# Patient Record
Sex: FEMALE | Race: WHITE | NOT HISPANIC OR LATINO
[De-identification: names, ages, dates, MRNs, and addresses within clinical notes are randomized per-mention and may not be internally consistent; named-entity substitution may affect disease eponyms.]

---

## 2017-01-14 ENCOUNTER — TRANSCRIPTION ENCOUNTER (OUTPATIENT)
Age: 52
End: 2017-01-14

## 2018-07-26 ENCOUNTER — EMERGENCY (EMERGENCY)
Facility: HOSPITAL | Age: 53
LOS: 0 days | Discharge: HOME | End: 2018-07-26
Attending: EMERGENCY MEDICINE
Payer: COMMERCIAL

## 2018-07-26 VITALS
RESPIRATION RATE: 17 BRPM | DIASTOLIC BLOOD PRESSURE: 82 MMHG | OXYGEN SATURATION: 98 % | HEART RATE: 92 BPM | TEMPERATURE: 98 F | SYSTOLIC BLOOD PRESSURE: 124 MMHG

## 2018-07-26 VITALS
TEMPERATURE: 98 F | DIASTOLIC BLOOD PRESSURE: 75 MMHG | HEIGHT: 66 IN | RESPIRATION RATE: 16 BRPM | OXYGEN SATURATION: 100 % | WEIGHT: 125 LBS | HEART RATE: 80 BPM | SYSTOLIC BLOOD PRESSURE: 110 MMHG

## 2018-07-26 DIAGNOSIS — R59.9 ENLARGED LYMPH NODES, UNSPECIFIED: ICD-10-CM

## 2018-07-26 DIAGNOSIS — Z79.1 LONG TERM (CURRENT) USE OF NON-STEROIDAL ANTI-INFLAMMATORIES (NSAID): ICD-10-CM

## 2018-07-26 DIAGNOSIS — B02.9 ZOSTER WITHOUT COMPLICATIONS: ICD-10-CM

## 2018-07-26 DIAGNOSIS — F17.210 NICOTINE DEPENDENCE, CIGARETTES, UNCOMPLICATED: ICD-10-CM

## 2018-07-26 DIAGNOSIS — R07.9 CHEST PAIN, UNSPECIFIED: ICD-10-CM

## 2018-07-26 LAB
ALBUMIN SERPL ELPH-MCNC: 3.5 G/DL — SIGNIFICANT CHANGE UP (ref 3.3–5)
ALP SERPL-CCNC: 75 U/L — SIGNIFICANT CHANGE UP (ref 40–120)
ALT FLD-CCNC: 22 U/L — SIGNIFICANT CHANGE UP (ref 12–78)
ANION GAP SERPL CALC-SCNC: 5 MMOL/L — SIGNIFICANT CHANGE UP (ref 5–17)
APTT BLD: 26.8 SEC — LOW (ref 27.5–37.4)
AST SERPL-CCNC: 8 U/L — LOW (ref 15–37)
BASOPHILS # BLD AUTO: 0.04 K/UL — SIGNIFICANT CHANGE UP (ref 0–0.2)
BASOPHILS NFR BLD AUTO: 0.6 % — SIGNIFICANT CHANGE UP (ref 0–2)
BILIRUB SERPL-MCNC: 0.2 MG/DL — SIGNIFICANT CHANGE UP (ref 0.2–1.2)
BUN SERPL-MCNC: 17 MG/DL — SIGNIFICANT CHANGE UP (ref 7–23)
CALCIUM SERPL-MCNC: 8.7 MG/DL — SIGNIFICANT CHANGE UP (ref 8.5–10.1)
CHLORIDE SERPL-SCNC: 108 MMOL/L — SIGNIFICANT CHANGE UP (ref 96–108)
CK MB BLD-MCNC: <1.4 % — SIGNIFICANT CHANGE UP (ref 0–3.5)
CK MB CFR SERPL CALC: <1 NG/ML — SIGNIFICANT CHANGE UP (ref 0.5–3.6)
CK SERPL-CCNC: 70 U/L — SIGNIFICANT CHANGE UP (ref 26–192)
CO2 SERPL-SCNC: 29 MMOL/L — SIGNIFICANT CHANGE UP (ref 22–31)
CREAT SERPL-MCNC: 0.73 MG/DL — SIGNIFICANT CHANGE UP (ref 0.5–1.3)
EOSINOPHIL # BLD AUTO: 0.12 K/UL — SIGNIFICANT CHANGE UP (ref 0–0.5)
EOSINOPHIL NFR BLD AUTO: 1.9 % — SIGNIFICANT CHANGE UP (ref 0–6)
GLUCOSE SERPL-MCNC: 110 MG/DL — HIGH (ref 70–99)
HCT VFR BLD CALC: 36.9 % — SIGNIFICANT CHANGE UP (ref 34.5–45)
HGB BLD-MCNC: 12.6 G/DL — SIGNIFICANT CHANGE UP (ref 11.5–15.5)
IMM GRANULOCYTES NFR BLD AUTO: 0.3 % — SIGNIFICANT CHANGE UP (ref 0–1.5)
INR BLD: 1.03 RATIO — SIGNIFICANT CHANGE UP (ref 0.88–1.16)
LYMPHOCYTES # BLD AUTO: 2.1 K/UL — SIGNIFICANT CHANGE UP (ref 1–3.3)
LYMPHOCYTES # BLD AUTO: 33.5 % — SIGNIFICANT CHANGE UP (ref 13–44)
MCHC RBC-ENTMCNC: 31.2 PG — SIGNIFICANT CHANGE UP (ref 27–34)
MCHC RBC-ENTMCNC: 34.1 GM/DL — SIGNIFICANT CHANGE UP (ref 32–36)
MCV RBC AUTO: 91.3 FL — SIGNIFICANT CHANGE UP (ref 80–100)
MONOCYTES # BLD AUTO: 0.56 K/UL — SIGNIFICANT CHANGE UP (ref 0–0.9)
MONOCYTES NFR BLD AUTO: 8.9 % — SIGNIFICANT CHANGE UP (ref 2–14)
NEUTROPHILS # BLD AUTO: 3.42 K/UL — SIGNIFICANT CHANGE UP (ref 1.8–7.4)
NEUTROPHILS NFR BLD AUTO: 54.8 % — SIGNIFICANT CHANGE UP (ref 43–77)
NRBC # BLD: 0 /100 WBCS — SIGNIFICANT CHANGE UP (ref 0–0)
PLATELET # BLD AUTO: 186 K/UL — SIGNIFICANT CHANGE UP (ref 150–400)
POTASSIUM SERPL-MCNC: 4.4 MMOL/L — SIGNIFICANT CHANGE UP (ref 3.5–5.3)
POTASSIUM SERPL-SCNC: 4.4 MMOL/L — SIGNIFICANT CHANGE UP (ref 3.5–5.3)
PROT SERPL-MCNC: 6.7 GM/DL — SIGNIFICANT CHANGE UP (ref 6–8.3)
PROTHROM AB SERPL-ACNC: 11.2 SEC — SIGNIFICANT CHANGE UP (ref 9.8–12.7)
RBC # BLD: 4.04 M/UL — SIGNIFICANT CHANGE UP (ref 3.8–5.2)
RBC # FLD: 12.1 % — SIGNIFICANT CHANGE UP (ref 10.3–14.5)
SODIUM SERPL-SCNC: 142 MMOL/L — SIGNIFICANT CHANGE UP (ref 135–145)
TROPONIN I SERPL-MCNC: <.015 NG/ML — SIGNIFICANT CHANGE UP (ref 0.01–0.04)
WBC # BLD: 6.26 K/UL — SIGNIFICANT CHANGE UP (ref 3.8–10.5)
WBC # FLD AUTO: 6.26 K/UL — SIGNIFICANT CHANGE UP (ref 3.8–10.5)

## 2018-07-26 PROCEDURE — 99285 EMERGENCY DEPT VISIT HI MDM: CPT

## 2018-07-26 PROCEDURE — 71045 X-RAY EXAM CHEST 1 VIEW: CPT | Mod: 26

## 2018-07-26 RX ORDER — HYDROCORTISONE 1 %
1 OINTMENT (GRAM) TOPICAL
Qty: 1 | Refills: 0 | OUTPATIENT
Start: 2018-07-26 | End: 2018-08-01

## 2018-07-26 RX ORDER — KETOROLAC TROMETHAMINE 30 MG/ML
30 SYRINGE (ML) INJECTION ONCE
Qty: 0 | Refills: 0 | Status: DISCONTINUED | OUTPATIENT
Start: 2018-07-26 | End: 2018-07-26

## 2018-07-26 RX ORDER — HYDROCORTISONE 1 %
1 OINTMENT (GRAM) TOPICAL ONCE
Qty: 0 | Refills: 0 | Status: COMPLETED | OUTPATIENT
Start: 2018-07-26 | End: 2018-07-26

## 2018-07-26 RX ORDER — SODIUM CHLORIDE 9 MG/ML
3 INJECTION INTRAMUSCULAR; INTRAVENOUS; SUBCUTANEOUS ONCE
Qty: 0 | Refills: 0 | Status: COMPLETED | OUTPATIENT
Start: 2018-07-26 | End: 2018-07-26

## 2018-07-26 RX ORDER — VALACYCLOVIR 500 MG/1
1000 TABLET, FILM COATED ORAL ONCE
Qty: 0 | Refills: 0 | Status: COMPLETED | OUTPATIENT
Start: 2018-07-26 | End: 2018-07-26

## 2018-07-26 RX ORDER — VALACYCLOVIR 500 MG/1
1 TABLET, FILM COATED ORAL
Qty: 14 | Refills: 0 | OUTPATIENT
Start: 2018-07-26 | End: 2018-08-01

## 2018-07-26 RX ORDER — SODIUM CHLORIDE 9 MG/ML
1000 INJECTION INTRAMUSCULAR; INTRAVENOUS; SUBCUTANEOUS ONCE
Qty: 0 | Refills: 0 | Status: COMPLETED | OUTPATIENT
Start: 2018-07-26 | End: 2018-07-26

## 2018-07-26 RX ORDER — IBUPROFEN 200 MG
1 TABLET ORAL
Qty: 30 | Refills: 0 | OUTPATIENT
Start: 2018-07-26 | End: 2018-08-24

## 2018-07-26 RX ORDER — OXYCODONE HYDROCHLORIDE 5 MG/1
1 TABLET ORAL
Qty: 20 | Refills: 0 | OUTPATIENT
Start: 2018-07-26 | End: 2018-07-27

## 2018-07-26 RX ADMIN — SODIUM CHLORIDE 2000 MILLILITER(S): 9 INJECTION INTRAMUSCULAR; INTRAVENOUS; SUBCUTANEOUS at 20:13

## 2018-07-26 RX ADMIN — SODIUM CHLORIDE 1000 MILLILITER(S): 9 INJECTION INTRAMUSCULAR; INTRAVENOUS; SUBCUTANEOUS at 21:20

## 2018-07-26 RX ADMIN — Medication 30 MILLIGRAM(S): at 20:13

## 2018-07-26 RX ADMIN — SODIUM CHLORIDE 3 MILLILITER(S): 9 INJECTION INTRAMUSCULAR; INTRAVENOUS; SUBCUTANEOUS at 18:45

## 2018-07-26 RX ADMIN — Medication 50 MILLIGRAM(S): at 18:36

## 2018-07-26 RX ADMIN — VALACYCLOVIR 1000 MILLIGRAM(S): 500 TABLET, FILM COATED ORAL at 18:37

## 2018-07-26 RX ADMIN — Medication 30 MILLIGRAM(S): at 21:28

## 2018-07-26 RX ADMIN — Medication 1 APPLICATION(S): at 18:36

## 2018-07-26 NOTE — ED PROVIDER NOTE - NS ED ROS FT
Constitutional: (-) fever  (-)chills  (-)sweats  Eyes/ENT: (-) blurry vision, (-) epistaxis  (-)rhinorrhea   (-) sore throat    Cardiovascular: (+) chest pain, (-) palpitations (-) edema   Respiratory: (-) cough, (-) shortness of breath   Gastrointestinal: (-)nausea  (-)vomiting, (-) diarrhea  (-) abdominal pain   :  (-)dysuria, (-)frequency, (-)urgency, (-)hematuria  Musculoskeletal: (-) neck pain, (-) back pain, (-) joint pain  Integumentary: (+) rash, (-) edema  Neurological: (-) headache, (-) altered mental status  (-)LOC

## 2018-07-26 NOTE — ED PROVIDER NOTE - PHYSICAL EXAMINATION
General:     NAD, well-nourished, well-appearing  Head:     NC/AT, EOMI, oral mucosa moist  Neck:     trachea midline  Lungs:     CTA b/l, no w/r/r  CVS:     S1S2, RRR, no m/g/r  CHEST WALL: vesicular lesions in dermatomal distribution @T5  Abd:     +BS, s/nt/nd, no organomegaly  Ext:    2+ radial and pedal pulses, no c/c/e  Neuro: AAOx3, no sensory/motor deficits

## 2018-07-26 NOTE — ED ADULT NURSE REASSESSMENT NOTE - NS ED NURSE REASSESS COMMENT FT1
Pt able to ambulate safely and steadily w/out assistance, denies dizziness/weakness upon standing, IV removed, pt d/c home w/ family. Education deemed successful at time of discharge when patient provided a successful teach back demonstration. Pt educated extensively about follow up care, medication reviewed, and patient provided extensive education about diagnostic testing and results that were educated by the MD and reinforced by the RN.

## 2018-07-26 NOTE — ED PROVIDER NOTE - OBJECTIVE STATEMENT
53yoF; with no signif pmh; now p/w chest pain--began under scapula, radiating to under left breast, burning sensation, constant, pressure/burning. denies sob/palp. denies lightheadedness. denies f/c/s. also c/o left axillary lymph node enlargement and tenderness. denies sick contacts. denies travel. denies trauma.  smokes 1 pack per week.  PMH: denies  SOCIAL: +smoking, social etoh

## 2018-07-26 NOTE — ED PROVIDER NOTE - MEDICAL DECISION MAKING DETAILS
Patient with shingles rash, feels better s/p medication, educated on need to follow up with lymphadenopathy with surgeon and given referral.  cardiac enzymes negative, normal EKG.

## 2019-05-22 PROBLEM — Z00.00 ENCOUNTER FOR PREVENTIVE HEALTH EXAMINATION: Status: ACTIVE | Noted: 2019-05-22

## 2019-06-03 ENCOUNTER — APPOINTMENT (OUTPATIENT)
Dept: ORTHOPEDIC SURGERY | Facility: CLINIC | Age: 54
End: 2019-06-03

## 2019-09-27 ENCOUNTER — APPOINTMENT (OUTPATIENT)
Dept: ORTHOPEDIC SURGERY | Facility: CLINIC | Age: 54
End: 2019-09-27
Payer: COMMERCIAL

## 2019-09-27 PROCEDURE — 73502 X-RAY EXAM HIP UNI 2-3 VIEWS: CPT | Mod: RT

## 2019-09-27 PROCEDURE — 99203 OFFICE O/P NEW LOW 30 MIN: CPT

## 2019-09-27 NOTE — PHYSICAL EXAM
[de-identified] : General Exam\par \par Well developed, well nourished\par No apparent distress\par Oriented to person, place, and time\par Mood: Normal\par Affect: Normal\par Balance and coordination: Normal\par Gait: Normal \par \par Right hip exam\par \par Skin: Clean/dry and intact\par Inspection: No obvious deformity, no swelling, no ecchymosis.\par Tenderness:  no tenderness over greater trochanter/glut medius insertion. No tenderness pubic symphysis, pubic tubercle, hip flexors. No ttp ischial tuberosity or buttock. No ttp over the ASIS/Illiac crest.\par ROM: 0-120°. Internal rotation 30 external rotation 70\par Painful ROM: None\par Additional tests: No pain with circumduction pos impingement test at 90° mildly positive impingement test at 60° negative Christ [facility]\par Strength: 5/5 hip flexion/ADD/ABD/Q/H/TA/GS/EHL\par Neuro: Sensation in tact to light touch throughout in dp/sp/tib/max/saph distributions\par Pulses: 2+ DP/PT pulses  [de-identified] : \par The following radiographs were ordered and read by me during this patients visit. I reviewed each radiograph in detail with the patient and discussed the findings as highlighted below. \par \par AP pelvis AP lateral right hip were obtained today. There is hip dysplasia. There is arthritic changes with cystic changes in the femoral head and acetabulum\par

## 2019-09-27 NOTE — HISTORY OF PRESENT ILLNESS
[de-identified] : 54-year-old female chief complaint right hip pain for several months. Pain is located in her groin she denies a fall or trauma she denies a numbness or tingling she doesn't she lost mobility in her hip\par \par The patient's past medical history, past surgical history, medications, allergies, and social history were reviewed by me today with the patient and documented accordingly. In addition, the patient's family history, which is noncontributory to this visit, was also reviewed.

## 2019-10-18 ENCOUNTER — APPOINTMENT (OUTPATIENT)
Dept: ORTHOPEDIC SURGERY | Facility: CLINIC | Age: 54
End: 2019-10-18

## 2019-10-18 DIAGNOSIS — M25.561 PAIN IN RIGHT KNEE: ICD-10-CM

## 2019-10-25 ENCOUNTER — APPOINTMENT (OUTPATIENT)
Dept: ORTHOPEDIC SURGERY | Facility: CLINIC | Age: 54
End: 2019-10-25

## 2020-02-10 ENCOUNTER — APPOINTMENT (OUTPATIENT)
Dept: ORTHOPEDIC SURGERY | Facility: CLINIC | Age: 55
End: 2020-02-10

## 2020-02-12 ENCOUNTER — APPOINTMENT (OUTPATIENT)
Dept: ORTHOPEDIC SURGERY | Facility: CLINIC | Age: 55
End: 2020-02-12
Payer: COMMERCIAL

## 2020-02-12 DIAGNOSIS — Z87.828 PERSONAL HISTORY OF OTHER (HEALED) PHYSICAL INJURY AND TRAUMA: ICD-10-CM

## 2020-02-12 DIAGNOSIS — M17.11 UNILATERAL PRIMARY OSTEOARTHRITIS, RIGHT KNEE: ICD-10-CM

## 2020-02-12 PROCEDURE — 73560 X-RAY EXAM OF KNEE 1 OR 2: CPT | Mod: LT

## 2020-02-12 PROCEDURE — 73564 X-RAY EXAM KNEE 4 OR MORE: CPT | Mod: RT

## 2020-02-12 PROCEDURE — 99214 OFFICE O/P EST MOD 30 MIN: CPT

## 2020-04-15 ENCOUNTER — APPOINTMENT (OUTPATIENT)
Dept: ORTHOPEDIC SURGERY | Facility: CLINIC | Age: 55
End: 2020-04-15
Payer: COMMERCIAL

## 2020-04-15 PROCEDURE — 99441: CPT

## 2020-04-29 ENCOUNTER — TRANSCRIPTION ENCOUNTER (OUTPATIENT)
Age: 55
End: 2020-04-29

## 2020-04-30 ENCOUNTER — APPOINTMENT (OUTPATIENT)
Dept: ORTHOPEDIC SURGERY | Facility: CLINIC | Age: 55
End: 2020-04-30
Payer: COMMERCIAL

## 2020-04-30 PROCEDURE — 99442: CPT

## 2020-05-13 ENCOUNTER — OUTPATIENT (OUTPATIENT)
Dept: OUTPATIENT SERVICES | Facility: HOSPITAL | Age: 55
LOS: 1 days | End: 2020-05-13
Payer: COMMERCIAL

## 2020-05-13 ENCOUNTER — OUTPATIENT (OUTPATIENT)
Dept: OUTPATIENT SERVICES | Facility: HOSPITAL | Age: 55
LOS: 1 days | End: 2020-05-13

## 2020-05-13 ENCOUNTER — APPOINTMENT (OUTPATIENT)
Dept: RADIOLOGY | Facility: CLINIC | Age: 55
End: 2020-05-13
Payer: COMMERCIAL

## 2020-05-13 DIAGNOSIS — M25.551 PAIN IN RIGHT HIP: ICD-10-CM

## 2020-05-13 PROCEDURE — 27093 INJECTION FOR HIP X-RAY: CPT | Mod: RT

## 2020-05-13 PROCEDURE — 27093 INJECTION FOR HIP X-RAY: CPT

## 2020-05-13 PROCEDURE — 73525 CONTRAST X-RAY OF HIP: CPT | Mod: 26,RT

## 2020-05-13 PROCEDURE — 73525 CONTRAST X-RAY OF HIP: CPT

## 2020-06-08 ENCOUNTER — TRANSCRIPTION ENCOUNTER (OUTPATIENT)
Age: 55
End: 2020-06-08

## 2020-07-07 ENCOUNTER — APPOINTMENT (OUTPATIENT)
Dept: ORTHOPEDIC SURGERY | Facility: CLINIC | Age: 55
End: 2020-07-07
Payer: COMMERCIAL

## 2020-07-07 VITALS — TEMPERATURE: 98.7 F

## 2020-07-07 DIAGNOSIS — M47.812 SPONDYLOSIS W/OUT MYELOPATHY OR RADICULOPATHY, CERVICAL REGION: ICD-10-CM

## 2020-07-07 DIAGNOSIS — M54.12 RADICULOPATHY, CERVICAL REGION: ICD-10-CM

## 2020-07-07 PROCEDURE — 99215 OFFICE O/P EST HI 40 MIN: CPT

## 2020-07-07 PROCEDURE — 72040 X-RAY EXAM NECK SPINE 2-3 VW: CPT

## 2020-07-31 ENCOUNTER — APPOINTMENT (OUTPATIENT)
Dept: ORTHOPEDIC SURGERY | Facility: CLINIC | Age: 55
End: 2020-07-31
Payer: COMMERCIAL

## 2020-07-31 PROCEDURE — 99214 OFFICE O/P EST MOD 30 MIN: CPT | Mod: 95

## 2020-08-03 ENCOUNTER — APPOINTMENT (OUTPATIENT)
Dept: ORTHOPEDIC SURGERY | Facility: CLINIC | Age: 55
End: 2020-08-03
Payer: COMMERCIAL

## 2020-08-03 PROCEDURE — 99214 OFFICE O/P EST MOD 30 MIN: CPT

## 2020-08-21 ENCOUNTER — APPOINTMENT (OUTPATIENT)
Dept: ORTHOPEDIC SURGERY | Facility: CLINIC | Age: 55
End: 2020-08-21
Payer: COMMERCIAL

## 2020-08-21 DIAGNOSIS — M48.07 SPINAL STENOSIS, LUMBOSACRAL REGION: ICD-10-CM

## 2020-08-21 PROCEDURE — 99214 OFFICE O/P EST MOD 30 MIN: CPT | Mod: 95

## 2020-09-22 ENCOUNTER — TRANSCRIPTION ENCOUNTER (OUTPATIENT)
Age: 55
End: 2020-09-22

## 2020-09-22 RX ORDER — MELOXICAM 15 MG/1
15 TABLET ORAL
Qty: 60 | Refills: 1 | Status: ACTIVE | COMMUNITY
Start: 2019-09-27 | End: 1900-01-01

## 2020-10-20 ENCOUNTER — APPOINTMENT (OUTPATIENT)
Dept: ORTHOPEDIC SURGERY | Facility: CLINIC | Age: 55
End: 2020-10-20
Payer: COMMERCIAL

## 2020-10-20 PROCEDURE — 99442: CPT

## 2020-11-23 ENCOUNTER — APPOINTMENT (OUTPATIENT)
Dept: SPINE | Facility: CLINIC | Age: 55
End: 2020-11-23
Payer: COMMERCIAL

## 2020-11-23 VITALS
BODY MASS INDEX: 20.09 KG/M2 | HEIGHT: 66 IN | DIASTOLIC BLOOD PRESSURE: 67 MMHG | HEART RATE: 85 BPM | RESPIRATION RATE: 12 BRPM | TEMPERATURE: 98.7 F | SYSTOLIC BLOOD PRESSURE: 93 MMHG | WEIGHT: 125 LBS

## 2020-11-23 DIAGNOSIS — Z78.9 OTHER SPECIFIED HEALTH STATUS: ICD-10-CM

## 2020-11-23 PROCEDURE — 99203 OFFICE O/P NEW LOW 30 MIN: CPT

## 2020-11-23 NOTE — PHYSICAL EXAM
[Person] : oriented to person [Place] : oriented to place [Time] : oriented to time [Short Term Intact] : short term memory intact [Remote Intact] : remote memory intact [Span Intact] : the attention span was normal [Concentration Intact] : normal concentrating ability [Fluency] : fluency intact [Comprehension] : comprehension intact [Current Events] : adequate knowledge of current events [Past History] : adequate knowledge of personal past history [Vocabulary] : adequate range of vocabulary [Cranial Nerves Optic (II)] : visual acuity intact bilaterally,  pupils equal round and reactive to light [Cranial Nerves Oculomotor (III)] : extraocular motion intact [Cranial Nerves Trigeminal (V)] : facial sensation intact symmetrically [Cranial Nerves Facial (VII)] : face symmetrical [Cranial Nerves Vestibulocochlear (VIII)] : hearing was intact bilaterally [Cranial Nerves Glossopharyngeal (IX)] : tongue and palate midline [Cranial Nerves Accessory (XI - Cranial And Spinal)] : head turning and shoulder shrug symmetric [Cranial Nerves Hypoglossal (XII)] : there was no tongue deviation with protrusion [Motor Tone] : muscle tone was normal in all four extremities [Motor Strength] : muscle strength was normal in all four extremities [No Muscle Atrophy] : normal bulk in all four extremities [Sensation Tactile Decrease] : light touch was intact [Abnormal Walk] : normal gait [Balance] : balance was intact [Past-pointing] : there was no past-pointing [Tremor] : no tremor present [2+] : Ankle jerk left 2+ [Plantar Reflex Right Only] : normal on the right [Plantar Reflex Left Only] : normal on the left [Lo] : Lo's sign was not demonstrated [No Tenderness to Palpation] : no spine tenderness on palpation [Straight-Leg Raise Test - Left] : straight leg raise of the left leg was negative [Straight-Leg Raise Test - Right] : straight leg raise  of the right leg was negative [Able to toe walk] : the patient was able to toe walk [Able to heel walk] : the patient was able to heel walk

## 2020-11-23 NOTE — HISTORY OF PRESENT ILLNESS
[< 3 months] : less than 3 months [FreeTextEntry1] : Lower back pain  [de-identified] : This is a 55 year old female with twisting her back and sudden onset of lower back pain.  She has completed a course of PT and limited her activity.   She does feel that her pain has improved but she reports trouble sleeping.   She had trigger points over the summer in 2020 for the back pain and this was self limiting.  She is reporting muscle weakness and spasms throughout her lower extremities.  She is on NSAIDS that are helpful at times.  MRI of the lumbar spine  shows spondylolisthesis at L4 L5 with moderate stenosis.Noncompressive degenerative changes at other levels.

## 2020-11-23 NOTE — REASON FOR VISIT
[New Patient Visit] : a new patient visit [Other: _____] : [unfilled] [Referred By: _________] : Patient was referred by KATRINA [FreeTextEntry1] : Lower back pain

## 2020-11-23 NOTE — ASSESSMENT
[FreeTextEntry1] : 56 yo  female with lower back pain and  MRI shows spondylolisthesis of the L 4 L 5 level.   She was recommended to see a pain specialist for her back pain for epidurals.   Acupuncture and PT was also suggested.  Neurosurgery is not indicated at this time.  She has a follow up appointment with Dr Villarreal.

## 2020-12-15 ENCOUNTER — TRANSCRIPTION ENCOUNTER (OUTPATIENT)
Age: 55
End: 2020-12-15

## 2021-01-06 ENCOUNTER — TRANSCRIPTION ENCOUNTER (OUTPATIENT)
Age: 56
End: 2021-01-06

## 2021-01-27 ENCOUNTER — TRANSCRIPTION ENCOUNTER (OUTPATIENT)
Age: 56
End: 2021-01-27

## 2021-12-08 ENCOUNTER — NON-APPOINTMENT (OUTPATIENT)
Age: 56
End: 2021-12-08

## 2021-12-10 ENCOUNTER — APPOINTMENT (OUTPATIENT)
Dept: ORTHOPEDIC SURGERY | Facility: CLINIC | Age: 56
End: 2021-12-10
Payer: COMMERCIAL

## 2021-12-10 VITALS — WEIGHT: 125 LBS | HEIGHT: 66 IN | BODY MASS INDEX: 20.09 KG/M2

## 2021-12-10 VITALS — WEIGHT: 124 LBS | BODY MASS INDEX: 19.93 KG/M2 | HEIGHT: 66 IN

## 2021-12-10 DIAGNOSIS — M51.36 OTHER INTERVERTEBRAL DISC DEGENERATION, LUMBAR REGION: ICD-10-CM

## 2021-12-10 DIAGNOSIS — M43.16 SPONDYLOLISTHESIS, LUMBAR REGION: ICD-10-CM

## 2021-12-10 PROCEDURE — 72100 X-RAY EXAM L-S SPINE 2/3 VWS: CPT

## 2021-12-10 PROCEDURE — 99214 OFFICE O/P EST MOD 30 MIN: CPT

## 2021-12-22 ENCOUNTER — APPOINTMENT (OUTPATIENT)
Dept: ORTHOPEDIC SURGERY | Facility: CLINIC | Age: 56
End: 2021-12-22
Payer: COMMERCIAL

## 2021-12-22 VITALS — WEIGHT: 124 LBS | BODY MASS INDEX: 19.93 KG/M2 | HEIGHT: 66 IN

## 2021-12-22 DIAGNOSIS — M16.11 UNILATERAL PRIMARY OSTEOARTHRITIS, RIGHT HIP: ICD-10-CM

## 2021-12-22 PROCEDURE — 99213 OFFICE O/P EST LOW 20 MIN: CPT

## 2021-12-22 PROCEDURE — 73502 X-RAY EXAM HIP UNI 2-3 VIEWS: CPT | Mod: RT

## 2021-12-22 NOTE — HISTORY OF PRESENT ILLNESS
[de-identified] : 54-year-old female chief complaint right hip pain for several months. Pain is located in her groin she denies a fall or trauma she denies a numbness or tingling she doesn't she lost mobility in her hip

## 2021-12-22 NOTE — PHYSICAL EXAM
[de-identified] : Right hip exam\par \par Skin: Clean/dry and intact\par Inspection: No obvious deformity, no swelling, no ecchymosis.\par Tenderness: no tenderness over greater trochanter/glut medius insertion. No tenderness pubic symphysis, pubic tubercle, hip flexors. No ttp ischial tuberosity or buttock. No ttp over the ASIS/Illiac crest.\par ROM: 0-120°. Internal rotation 30 external rotation 70\par Painful ROM: None\par Additional tests: No pain with circumduction pos impingement test at 90° mildly positive impingement test at 60° negative Christ negative [facility]\par Strength: 5/5 hip flexion/ADD/ABD/Q/H/TA/GS/EHL\par Neuro: Sensation in tact to light touch throughout in dp/sp/tib/max/saph distributions\par Pulses: 2+ DP/PT pulses  [de-identified] : \par The following radiographs were ordered and read by me during this patients visit. I reviewed each radiograph in detail with the patient and discussed the findings as highlighted below. \par \par AP pelvis AP lateral right hip were obtained today.  There is severe osteoarthritis with joint space narrowing osteophytes and sclerosis

## 2021-12-22 NOTE — DISCUSSION/SUMMARY
[de-identified] : I discussed the treatment of degenerative arthritis with the patient at length today. I described the spectrum of treatment from nonoperative modalities to total joint arthroplasty. Noninvasive and nonoperative treatment modalities include weight reduction, activity modification with low impact exercise, PRN use of acetaminophen or anti-inflammatory medication if tolerated, glucosamine/chondroitin supplements, and physical therapy. Further treatments can include corticosteroid injection and the use of hyaluronic acid injections. Definitive treatment can certainly include total joint arthroplasty also. The risks and benefits of each treatment options was discussed and all questions were answered.  Refer for arthroplasty evaluation

## 2022-03-30 ENCOUNTER — APPOINTMENT (OUTPATIENT)
Dept: ORTHOPEDIC SURGERY | Facility: CLINIC | Age: 57
End: 2022-03-30
Payer: COMMERCIAL

## 2022-03-30 VITALS — BODY MASS INDEX: 20.89 KG/M2 | HEIGHT: 66 IN | WEIGHT: 130 LBS

## 2022-03-30 DIAGNOSIS — M17.0 BILATERAL PRIMARY OSTEOARTHRITIS OF KNEE: ICD-10-CM

## 2022-03-30 PROCEDURE — 73564 X-RAY EXAM KNEE 4 OR MORE: CPT | Mod: 50

## 2022-03-30 PROCEDURE — 99214 OFFICE O/P EST MOD 30 MIN: CPT

## 2022-03-30 RX ORDER — NAPROXEN 500 MG/1
500 TABLET ORAL
Qty: 60 | Refills: 0 | Status: ACTIVE | COMMUNITY
Start: 2022-03-30 | End: 1900-01-01

## 2022-03-30 NOTE — PHYSICAL EXAM
[de-identified] : General appearance: well nourished and hydrated, pleasant, alert and oriented x 3, cooperative.\par HEENT: Normocephalic, EOM intact, Nasal septum midline, Oral cavity clear, External auditory canal clear.\par Cardiovascular: no apparent abnormalities, no lower leg edema, no varicosities, pedal pulses are palpable.\par Lymphatics Lymph nodes: none palpated, Lymphedema: not present.\par Neurologic: sensation is normal, no muscle weakness in upper or lower extremities, patella tendon reflexes intact .\par Dermatologic no apparent skin lesions, moist, warm, no rash.\par Spine: cervical spine appears normal and moves freely, thoracic spine appears normal and moves freely, lumbosacral spine appears normal and moves freely.\par Gait: nonantalgic.\par \par Left knee\par Inspection: no effusion or erythema.\par Wounds: none.\par Alignment: normal.\par Palpation: no specific tenderness on palpation.\par ROM active (in degrees): 0-145\par Ligamentous laxity: all ligaments appear stable,, negative ant. drawer test, negative post. drawer test, stable to varus stress test, stable to valgus stress test. negative Lachman's test, negative pivot shift test\par Meniscal Test: negative McMurrays, negative Ela.\par Patellofemoral Alignment Test: Q angle-, normal.\par Muscle Test: good quad strength.\par Leg examination: calf is soft and non-tender.\par \par Right knee\par Inspection: no effusion or erythema.\par Wounds: healed midline incision, healed arthroscopic portals \par Alignment: normal.\par Palpation: no specific tenderness on palpation.\par ROM active (in degrees): 0-145\par Ligamentous laxity: all ligaments appear stable,, negative ant. drawer test, negative post. drawer test, stable to varus stress test, stable to valgus stress test. negative Lachman's test, negative pivot shift test\par Meniscal Test: negative McMurrays, negative Ela.\par Patellofemoral Alignment Test: Q angle-, normal.\par Muscle Test: good quad strength.\par Leg examination: calf is soft and non-tender.\par \par Left hip\par Inspection: No swelling or ecchymosis.\par Wounds: none.\par Palpation: non-tender.\par Stability: no instability.\par Strength: 5/5 all motor groups.\par ROM: no pain with FROM.\par Leg length: equal.\par \par Right hip\par Inspection: No swelling or ecchymosis.\par Wounds: none.\par Palpation: non-tender.\par Stability: no instability.\par Strength: 5/5 all motor groups.\par ROM: no pain with FROM palpable click especially with extremes of rotation\par Leg length: equal.\par \par Left ankle\par Inspection: no erythema noted, no swelling noted.\par Palpation: no pain on palpation .\par ROM: FROM without crepitus.\par Muscle strength: 5/5.\par Stability: no instability noted.\par \par Right ankle\par Inspection: no erythema noted, no swelling noted.\par ROM: FROM without crepitus.\par Palpation: no pain on palpation .\par Muscle strength: 5/5.\par Stability: no instability noted.\par \par Left foot\par Inspection: color, texture and turgor are normal.\par ROM: full range of motion of all joints without pain or crepitus.\par Palpation: no tenderness.\par Stability: no instability noted.\par \par Right foot\par Inspection: color, texture and turgor are normal.\par ROM: full range of motion of all joints without pain or crepitus.\par Palpation: no tenderness.\par Stability: no instability noted. [de-identified] : Left knee xray merchant view, taken at the office today demonstrates joint space narrowing and a well centered patella, patellofemoral arthritis.  \par \par Right knee xrays, standing AP/Lateral, Merchant films, and 45 degree PA standing view taken at the office today shows normal alignment, patella sits at an appropriate height in a central position, evidence of ACL reconstruction, patella screws noted, central position with slight joint space narrowing, Kellgren and Issac grade 1

## 2022-03-30 NOTE — DISCUSSION/SUMMARY
[de-identified] : Discussed at length the nature of the patients condition. Their right knee symptoms are minimal due to her degenerative arthritis. I believe most of her symptoms are from her right hip due to a possible labral tear. I have referred her  back to Dr. Forrest for further evaluation and a possible MRI. In the interim, patient may participate in activities as tolerated. Patient may follow up with xrays as needed.\par

## 2022-03-30 NOTE — ADDENDUM
[FreeTextEntry1] : This note was written by Paris Flynn on 02/12/2020 acting as scribe for Dr. Jimy Sinha M.D.\par \par I, Dr. Jimy Sinha M.D., have read and attest that all the information, medical decision making and discharge instructions within are true and accurate.

## 2022-03-30 NOTE — HISTORY OF PRESENT ILLNESS
[de-identified] : 54 year old female presents for initial evaluation of right hip and groin pain. Patient reports right ACLreconstruction 30 years ago and waned to see if it  contributed to her hip pain or if she needed any follow up for her right knee. Her right hip has been bothering her for 2 years, described as diffuse pain near her groin and buttocks.She has seen Dr. Forrest who recommended Mobic and an MRI, which she has not done yet as she is waiting for her deductible to go down. She denies instability and is able to walk 0.5 miles at a time and takes stairs with some pain. She denies current buckling of her knee and is taking some Tylenol and ibuprofen with little benefit.

## 2022-04-06 NOTE — HISTORY OF PRESENT ILLNESS
[de-identified] : SAL DUENAS is a 56 year old female who presents for follow up evaluation of bilateral knee symptoms, worse on the right. Reports to feel discomfort and occasional buckling. Pt is pending a visit with Dr. Russo to discuss having a right THR but would like to know is she should proceed with the THR or TKR. She is also under the care of Dr. Corral and Dr. Villarreal. She would like a script fot PT and Narprosyn.

## 2022-04-06 NOTE — DISCUSSION/SUMMARY
[de-identified] : Patients bilateral knee symptoms are related to degenerative arthritis. I do not think any surgical intervention is warranted. Pt was given a script for Naproxen. Patient can continue activities as tolerated. All questions answered, understanding verbalized. Patient in agreement with plan of care. I will see her back in 6 months with xrays.

## 2022-04-06 NOTE — PHYSICAL EXAM
[de-identified] : General appearance: well nourished and hydrated, pleasant, alert and oriented x 3, cooperative.\par HEENT: Normocephalic, EOM intact, Nasal septum midline, Oral cavity clear, External auditory canal clear.\par Cardiovascular: no apparent abnormalities, no lower leg edema, no varicosities, pedal pulses are palpable.\par Lymphatics Lymph nodes: none palpated, Lymphedema: not present.\par Neurologic: sensation is normal, no muscle weakness in upper or lower extremities, patella tendon reflexes intact .\par Dermatologic no apparent skin lesions, moist, warm, no rash.\par Spine: cervical spine appears normal and moves freely, thoracic spine appears normal and moves freely, lumbosacral spine appears normal and moves freely.\par Gait: nonantalgic.\par \par Left knee\par Inspection: no effusion or erythema.\par Wounds: none.\par Alignment: normal.\par Palpation: no specific tenderness on palpation.\par ROM active (in degrees): 0-145\par Ligamentous laxity: all ligaments appear stable,, negative ant. drawer test, negative post. drawer test, stable to varus stress test, stable to valgus stress test. negative Lachman's test, negative pivot shift test\par Meniscal Test: negative McMurrays, negative Ela.\par Patellofemoral Alignment Test: Q angle-, normal.\par Muscle Test: good quad strength.\par Leg examination: calf is soft and non-tender.\par \par Right knee\par Inspection: no effusion or erythema.\par Wounds: healed midline incision, healed arthroscopic portals \par Alignment: normal.\par Palpation: no specific tenderness on palpation.\par ROM active (in degrees): 0-145\par Ligamentous laxity: all ligaments appear stable,, negative ant. drawer test, negative post. drawer test, stable to varus stress test, stable to valgus stress test. negative Lachman's test, negative pivot shift test\par Meniscal Test: negative McMurrays, negative Ela.\par Patellofemoral Alignment Test: Q angle-, normal.\par Muscle Test: good quad strength.\par Leg examination: calf is soft and non-tender. [de-identified] : Left knee Four views of the knee including AP, Eastman, Lateral and Merchant Views taken in the office today demonstrate normal alignment and joint space, the patella is in normal position and height, no soft tissue abnormality and no effusion. No fracture and no apparent osseous pathology. Impression: Normal knee \par \par Right knee xrays, standing AP/Lateral, Merchant films, and 45 degree PA standing view taken at the office today shows normal alignment, patella sits at an appropriate height in a central position, evidence of ACL reconstruction, patella screws noted, central position with slight joint space narrowing, Kellgren and Issac grade 1.

## 2022-04-11 ENCOUNTER — APPOINTMENT (OUTPATIENT)
Dept: ORTHOPEDIC SURGERY | Facility: CLINIC | Age: 57
End: 2022-04-11
Payer: COMMERCIAL

## 2022-04-11 VITALS — BODY MASS INDEX: 20.73 KG/M2 | WEIGHT: 129 LBS | HEIGHT: 66 IN

## 2022-04-11 DIAGNOSIS — M16.11 UNILATERAL PRIMARY OSTEOARTHRITIS, RIGHT HIP: ICD-10-CM

## 2022-04-11 DIAGNOSIS — M25.552 PAIN IN LEFT HIP: ICD-10-CM

## 2022-04-11 DIAGNOSIS — M25.551 PAIN IN RIGHT HIP: ICD-10-CM

## 2022-04-11 PROCEDURE — 73523 X-RAY EXAM HIPS BI 5/> VIEWS: CPT

## 2022-04-11 PROCEDURE — 99214 OFFICE O/P EST MOD 30 MIN: CPT

## 2022-04-11 NOTE — HISTORY OF PRESENT ILLNESS
[de-identified] : This is very nice 56-year-old female experiencing right hip and groin and thigh pain, which is severe in intensity.  Mild left hip buttock pain.  No pain in the left groin.  Has history of spondylolisthesis.  The pain substantially limits activities of daily living. Walking tolerance is reduced.  Does not take any pain medication for this.  Does physical therapy for her back but not for the hips.  The patient denies any radiation of the pain to the feet and it is not associated with numbness, tingling, or weakness.

## 2022-04-11 NOTE — DISCUSSION/SUMMARY
[de-identified] : This patient is right hip osteoarthritis and a normal left hip.  The patient is not an appropriate candidate for surgical intervention at this time. An extensive discussion was conducted on the natural history of the disease and the variety of surgical and non-surgical options available to the patient including, but not limited to non-steroidal anti-inflammatory medications, steroid injections, physical therapy, maintenance of ideal body weight, and reduction of activity.  I offered a prescription for Mobic but she preferred to take over-the-counter NSAIDs as needed for pain.  I did prescribe physical therapy.\par The patient will schedule an appointment as needed.\par

## 2022-04-11 NOTE — PHYSICAL EXAM
[de-identified] : Patient is well nourished, well-developed, in no acute distress, with appropriate mood and affect. The patient is oriented to time, place, and person. Respirations are even and unlabored. Gait evaluation does not reveal a limp. There is no inguinal adenopathy.  The right limb is well-perfused and showed 2+ dp/pt pulses, without skin lesions, shows a grossly normal motor and sensory examination. Examination of the hip shows no skin lesions. Hip motion is full and painless from 0-90 degrees extension to flexion, 20 degrees adduction and 20 degrees abduction, and 15 degrees internal and 30 degrees external rotation. FADIR is positive and STARLA is positive. Stinchfield test is positive. The left limb is well-perfused and showed 2+ dp/pt pulses, without skin lesions, shows a grossly normal motor and sensory examination. Examination of the hip shows no skin lesions. Hip motion is full and painless from 0-90 degrees extension to flexion, 20 degrees adduction and 20 degrees abduction, and 15 degrees internal and 30 degrees external rotation. FADIR is negative and STARLA is negative. Stinchfield test is negative.  Leg lengths are approximately equal. Both hips are stable and muscle strength is normal with good strength with resisted abduction and adduction. Pedal pulses are palpable. [de-identified] : AP and lateral x-rays of the right hip, pelvis, and femur were ordered and taken in the office and demonstrate degenerative joint disease of the hip with joint space narrowing, osteophyte formation, and subchondral sclerosis.\par \par AP and lateral x-rays of the left hip, pelvis, and femur were ordered and taken in the office and demonstrate no evidence of degenerative joint disease of the hip with maintained joint space and no evidence of fractures or other intraarticular pathology.

## 2022-07-22 ENCOUNTER — RX RENEWAL (OUTPATIENT)
Age: 57
End: 2022-07-22

## 2022-07-25 RX ORDER — IBUPROFEN 600 MG/1
600 TABLET, FILM COATED ORAL 3 TIMES DAILY
Qty: 60 | Refills: 0 | Status: ACTIVE | COMMUNITY
Start: 2022-07-25 | End: 1900-01-01

## 2022-08-15 ENCOUNTER — RX RENEWAL (OUTPATIENT)
Age: 57
End: 2022-08-15

## 2022-08-15 RX ORDER — IBUPROFEN 600 MG/1
600 TABLET, FILM COATED ORAL 3 TIMES DAILY
Qty: 90 | Refills: 0 | Status: ACTIVE | COMMUNITY
Start: 2022-07-22 | End: 1900-01-01

## 2022-10-02 ENCOUNTER — NON-APPOINTMENT (OUTPATIENT)
Age: 57
End: 2022-10-02

## 2022-10-19 NOTE — REVIEW OF SYSTEMS
[Joint Pain] : joint pain [Negative] : Heme/Lymph Helical Rim Text: The closure involved the helical rim.

## 2023-04-17 ENCOUNTER — RX RENEWAL (OUTPATIENT)
Age: 58
End: 2023-04-17

## 2023-04-17 RX ORDER — NAPROXEN SODIUM 550 MG/1
550 TABLET ORAL
Qty: 60 | Refills: 1 | Status: ACTIVE | COMMUNITY
Start: 2020-07-07 | End: 1900-01-01

## 2024-09-09 ENCOUNTER — NON-APPOINTMENT (OUTPATIENT)
Age: 59
End: 2024-09-09

## 2025-03-26 NOTE — ED ADULT TRIAGE NOTE - HEIGHT IN FEET
Detail Level: Zone Photo Preface (Leave Blank If You Do Not Want): Photographs were obtained today 5

## 2025-06-20 ENCOUNTER — APPOINTMENT (OUTPATIENT)
Dept: ORTHOPEDIC SURGERY | Facility: CLINIC | Age: 60
End: 2025-06-20
Payer: COMMERCIAL

## 2025-06-20 VITALS — WEIGHT: 144 LBS | HEIGHT: 66 IN | BODY MASS INDEX: 23.14 KG/M2

## 2025-06-20 PROCEDURE — G2211 COMPLEX E/M VISIT ADD ON: CPT | Mod: NC

## 2025-06-20 PROCEDURE — 99204 OFFICE O/P NEW MOD 45 MIN: CPT

## 2025-06-20 PROCEDURE — 73523 X-RAY EXAM HIPS BI 5/> VIEWS: CPT
